# Patient Record
Sex: FEMALE | Race: WHITE | Employment: FULL TIME | URBAN - METROPOLITAN AREA
[De-identification: names, ages, dates, MRNs, and addresses within clinical notes are randomized per-mention and may not be internally consistent; named-entity substitution may affect disease eponyms.]

---

## 2017-07-26 ENCOUNTER — OFFICE VISIT (OUTPATIENT)
Dept: FAMILY MEDICINE CLINIC | Facility: CLINIC | Age: 47
End: 2017-07-26

## 2017-07-26 VITALS
RESPIRATION RATE: 20 BRPM | BODY MASS INDEX: 30.86 KG/M2 | WEIGHT: 183 LBS | OXYGEN SATURATION: 98 % | HEIGHT: 64.5 IN | TEMPERATURE: 98 F | DIASTOLIC BLOOD PRESSURE: 74 MMHG | SYSTOLIC BLOOD PRESSURE: 112 MMHG | HEART RATE: 94 BPM

## 2017-07-26 DIAGNOSIS — Z02.89 ENCOUNTER FOR PHYSICAL EXAMINATION RELATED TO EMPLOYMENT: Primary | ICD-10-CM

## 2017-07-26 PROCEDURE — 99396 PREV VISIT EST AGE 40-64: CPT | Performed by: FAMILY MEDICINE

## 2017-07-26 NOTE — PROGRESS NOTES
CHIEF COMPLAINT:   Patient presents with:  Employment Physical      HPI:   Sonia Hernandez is a 48year old female who presents for a complete physical exam. Pt is occupational therapist. Has had tb testing done already.     Patient has no new health con or other bleeding disorders  ENDOCRINE: denies thyroid history  ALLERGY/ASTHMA: denies hx of seasonal allergies or asthma    EXAM:   /74   Pulse 94   Temp 98.1 °F (36.7 °C) (Oral)   Resp 20   Ht 64.5\"   Wt 183 lb   LMP 07/05/2017   SpO2 98%   BMI 30

## 2017-08-04 ENCOUNTER — OFFICE VISIT (OUTPATIENT)
Dept: FAMILY MEDICINE CLINIC | Facility: CLINIC | Age: 47
End: 2017-08-04

## 2017-08-04 VITALS
SYSTOLIC BLOOD PRESSURE: 116 MMHG | BODY MASS INDEX: 30.86 KG/M2 | RESPIRATION RATE: 16 BRPM | HEART RATE: 81 BPM | TEMPERATURE: 98 F | DIASTOLIC BLOOD PRESSURE: 80 MMHG | WEIGHT: 183 LBS | OXYGEN SATURATION: 98 % | HEIGHT: 64.5 IN

## 2017-08-04 DIAGNOSIS — J02.9 SORE THROAT: Primary | ICD-10-CM

## 2017-08-04 LAB — CONTROL LINE PRESENT WITH A CLEAR BACKGROUND (YES/NO): YES YES/NO

## 2017-08-04 PROCEDURE — 99213 OFFICE O/P EST LOW 20 MIN: CPT | Performed by: FAMILY MEDICINE

## 2017-08-04 PROCEDURE — 87081 CULTURE SCREEN ONLY: CPT | Performed by: FAMILY MEDICINE

## 2017-08-04 PROCEDURE — 87880 STREP A ASSAY W/OPTIC: CPT | Performed by: FAMILY MEDICINE

## 2017-08-04 NOTE — PATIENT INSTRUCTIONS
Most viral illnesses get worse for the first 3-5 days, then plateau and improve gradually over the next 3-5 days. Monitor symptoms for now. Use otc meds for comfort as needed-- ibuprofen for pain.    If no better in 2-3 days, or if symptoms steadily wor

## 2017-08-04 NOTE — PROGRESS NOTES
CHIEF COMPLAINT:   Patient presents with:  Sore Throat: x 2 days        HPI:   Leann Howard is a 48year old female presents to clinic with complaint of sore throat. Patient has had for 1 day.  Patient denies congestion, cough,fever, headache, nause rashes,no suspicious lesions  HEAD: atraumatic, normocephalic  EYES: conjunctivae clear, EOM intact  EARS: TM's clear, non-injected, no bulging, retraction, or fluid bilaterally  NOSE: nostrils patent, no exudates, nasal mucosa pink and noninflamed  THROAT plan.  The patient is asked to follow up with their PCP as needed.

## 2018-08-08 ENCOUNTER — OFFICE VISIT (OUTPATIENT)
Dept: FAMILY MEDICINE CLINIC | Facility: CLINIC | Age: 48
End: 2018-08-08

## 2018-08-08 VITALS
SYSTOLIC BLOOD PRESSURE: 122 MMHG | HEART RATE: 84 BPM | TEMPERATURE: 99 F | HEIGHT: 66 IN | RESPIRATION RATE: 18 BRPM | DIASTOLIC BLOOD PRESSURE: 76 MMHG | BODY MASS INDEX: 28.93 KG/M2 | WEIGHT: 180 LBS | OXYGEN SATURATION: 99 %

## 2018-08-08 DIAGNOSIS — Z00.00 PHYSICAL EXAM: Primary | ICD-10-CM

## 2018-08-08 PROCEDURE — 99396 PREV VISIT EST AGE 40-64: CPT | Performed by: PHYSICIAN ASSISTANT

## 2018-08-08 NOTE — PROGRESS NOTES
CHIEF COMPLAINT:         HPI:   Lizzette Rodriguez is a 50year old female who presents for a complete physical exam for work. She is an occupational therapist. TB with work annually. Last one around 11/2017-negative.  She takes Singulair for seasonal aller bleeding disorders  ENDOCRINE: denies thyroid history  ALLERGY/ASTHMA: See HPI     EXAM:   /76   Pulse 84   Temp 98.7 °F (37.1 °C) (Oral)   Resp 18   Ht 66\"   Wt 180 lb   LMP 07/25/2018   SpO2 99%   BMI 29.05 kg/m²   Body mass index is 29.05 kg/m².

## 2019-02-20 ENCOUNTER — OFFICE VISIT (OUTPATIENT)
Dept: FAMILY MEDICINE CLINIC | Facility: CLINIC | Age: 49
End: 2019-02-20
Payer: COMMERCIAL

## 2019-02-20 VITALS
SYSTOLIC BLOOD PRESSURE: 120 MMHG | WEIGHT: 180 LBS | TEMPERATURE: 98 F | BODY MASS INDEX: 28.93 KG/M2 | HEIGHT: 66 IN | HEART RATE: 82 BPM | OXYGEN SATURATION: 98 % | RESPIRATION RATE: 18 BRPM | DIASTOLIC BLOOD PRESSURE: 82 MMHG

## 2019-02-20 DIAGNOSIS — J02.9 SORE THROAT: Primary | ICD-10-CM

## 2019-02-20 LAB
CONTROL LINE PRESENT WITH A CLEAR BACKGROUND (YES/NO): YES YES/NO
STREP GRP A CUL-SCR: NEGATIVE

## 2019-02-20 PROCEDURE — 99213 OFFICE O/P EST LOW 20 MIN: CPT | Performed by: NURSE PRACTITIONER

## 2019-02-20 PROCEDURE — 87880 STREP A ASSAY W/OPTIC: CPT | Performed by: NURSE PRACTITIONER

## 2019-02-20 PROCEDURE — 87081 CULTURE SCREEN ONLY: CPT | Performed by: NURSE PRACTITIONER

## 2019-02-20 NOTE — PROGRESS NOTES
CHIEF COMPLAINT:   Patient presents with:  Sore Throat: pt requesting rapid strep test - Started yesterday        HPI:   Elton Armenta is a 46year old female presents to clinic with complaint of sore throat. Patient has had for 1 days.  Symptoms hav EARS: TM's clear, non-injected, no bulging, retraction, or fluid bilaterally  NOSE: nostrils patent, clear nasal discharge, nasal mucosa pink  THROAT: oral mucosa pink, moist. Posterior pharynx erythematous and injected. no exudates. Tonsils 2/4.   Breath n A test has been done to find out if you or your child have strep throat. Call this facility or your healthcare provider if you were not given your test results. If the test is positive for strep infection, you will need to take antibiotic medicines.  A pres Other medicine for a child: You can give your child acetaminophen for fever, fussiness, or discomfort. In babies over 7 months of age, you may use ibuprofen instead of acetaminophen.  If your child has chronic liver or kidney disease or ever had a stomach u · Don’t have close contact with people who have sore throats, colds, or other upper respiratory infections. · Don’t smoke, and stay away from secondhand smoke. · Stay up to date with of your vaccines.   Date Last Reviewed: 11/1/2017  © [id number] [employer]

## 2020-02-07 ENCOUNTER — HOSPITAL ENCOUNTER (OUTPATIENT)
Age: 50
Discharge: HOME | End: 2020-02-07
Attending: FAMILY MEDICINE
Payer: COMMERCIAL

## 2020-02-07 VITALS
OXYGEN SATURATION: 95 % | WEIGHT: 180 LBS | TEMPERATURE: 101 F | RESPIRATION RATE: 18 BRPM | BODY MASS INDEX: 28.93 KG/M2 | HEART RATE: 115 BPM | SYSTOLIC BLOOD PRESSURE: 116 MMHG | HEIGHT: 66 IN | DIASTOLIC BLOOD PRESSURE: 76 MMHG

## 2020-02-07 DIAGNOSIS — J10.1 INFLUENZA A: Primary | ICD-10-CM

## 2020-02-07 LAB
POCT INFLUENZA A: POSITIVE
POCT INFLUENZA B: NEGATIVE

## 2020-02-07 PROCEDURE — 87502 INFLUENZA DNA AMP PROBE: CPT | Performed by: FAMILY MEDICINE

## 2020-02-07 PROCEDURE — 99213 OFFICE O/P EST LOW 20 MIN: CPT

## 2020-02-07 PROCEDURE — 99204 OFFICE O/P NEW MOD 45 MIN: CPT

## 2020-02-07 RX ORDER — OSELTAMIVIR PHOSPHATE 75 MG/1
75 CAPSULE ORAL 2 TIMES DAILY
Qty: 10 CAPSULE | Refills: 0 | Status: SHIPPED | OUTPATIENT
Start: 2020-02-07 | End: 2020-02-12

## 2020-02-07 RX ORDER — BENZONATATE 100 MG/1
100 CAPSULE ORAL 3 TIMES DAILY PRN
Qty: 30 CAPSULE | Refills: 0 | Status: SHIPPED | OUTPATIENT
Start: 2020-02-07 | End: 2020-03-08

## 2020-02-08 NOTE — ED PROVIDER NOTES
Patient [facility]en in: [facility]      History   Patient presents with:  Cough/URI  Fever  Body ache and/or chills    Stated Complaint: COUGH/BODY ACHES/FEVER/CONGESTION SINCE YESTERDAY    HPI    57-year-old female history of hyperten ear exam. Bilateral TMs are clear. Nose: Bilateral nares are clear. Mouth: MMM. Posterior pharynx is clear. No erythema. No exudate. Uvula is midline. Neck: Supple, NT, FROM. No meningeal signs. LAD: No lymphadenopathy.   Heart: S1,S2 RRR, No murmur

## 2021-02-12 PROCEDURE — 88305 TISSUE EXAM BY PATHOLOGIST: CPT | Performed by: INTERNAL MEDICINE

## 2022-10-28 NOTE — ED INITIAL ASSESSMENT (HPI)
"Subjective:       Patient ID: Atif Morales is a 63 y.o. male.    Chief Complaint: Recurrent Skin Infections (Left lower leg )    HPI:  Pleasant 63-year-old gentleman who presents today with inflamed skin lesion on the left lateral lower leg.  Patient has some skin changes associated with sun damage as well.  At today he presents with what he thought was perhaps an infection.  Examination reveals inflamed seborrheic keratosis and cryotherapy was applied today gently.  Review of Systems   Constitutional: Negative.    HENT: Negative.     Eyes: Negative.    Respiratory: Negative.     Cardiovascular: Negative.    Gastrointestinal: Negative.    Endocrine: Negative.    Genitourinary: Negative.    Musculoskeletal: Negative.    Skin: Negative.         LLE skin lesion     Allergic/Immunologic: Negative.    Neurological: Negative.    Hematological: Negative.    Psychiatric/Behavioral: Negative.       Objective:      Vitals:    10/28/22 1314   BP: 116/82   Pulse: (!) 55   SpO2: 97%   Weight: (!) 147.1 kg (324 lb 4.8 oz)   Height: 6' 4" (1.93 m)      Physical Exam  Vitals and nursing note reviewed.   Constitutional:       Appearance: Normal appearance. He is obese.   HENT:      Head: Normocephalic and atraumatic.      Nose: Nose normal.      Mouth/Throat:      Mouth: Mucous membranes are moist.      Pharynx: Oropharynx is clear.   Eyes:      Extraocular Movements: Extraocular movements intact.      Conjunctiva/sclera: Conjunctivae normal.      Pupils: Pupils are equal, round, and reactive to light.   Cardiovascular:      Rate and Rhythm: Normal rate and regular rhythm.   Pulmonary:      Effort: Pulmonary effort is normal.      Breath sounds: Normal breath sounds.   Abdominal:      General: Abdomen is flat. Bowel sounds are normal.      Palpations: Abdomen is soft.   Musculoskeletal:         General: Normal range of motion.      Cervical back: Normal range of motion and neck supple.   Skin:     General: Skin is warm and dry. " Pt presents tonight with c/o fevers, non-productive cough, and body aches since yesterday. Tmax 103.      Capillary Refill: Capillary refill takes less than 2 seconds.      Findings: Lesion present.      Comments: Cryotherapy applied to has seborrheic keratosis approximately 1 x 1 cm.  No regular borders.  Keratosis evident mildly hyper pigmented.  All the characteristics of a SK.   Neurological:      General: No focal deficit present.      Mental Status: He is alert and oriented to person, place, and time.   Psychiatric:         Mood and Affect: Mood normal.         Behavior: Behavior normal.         Thought Content: Thought content normal.         Judgment: Judgment normal.       Results for orders placed or performed in visit on 09/02/22   TSH   Result Value Ref Range    TSH 2.828 0.400 - 4.000 uIU/mL      Assessment:       1. Seborrheic keratoses        Plan:       Seborrheic keratoses  Comments:  treated.         Medication List with Changes/Refills   Current Medications    ACIDOPHILUS (LACTINEX) 100 MILLION CELL PACKET    Take 1 tablet by mouth 2 (two) times daily.    BUSPIRONE (BUSPAR) 30 MG TAB    Take 1 tablet (30 mg total) by mouth 2 (two) times daily.    DIETARY SUPPLEMENT ORAL    Take by mouth.    ESCITALOPRAM OXALATE (LEXAPRO) 20 MG TABLET    Take 1 tablet (20 mg total) by mouth once daily.    FLUTICASONE PROPIONATE (FLONASE) 50 MCG/ACTUATION NASAL SPRAY    SHAKE LIQUID AND USE 2 SPRAYS(100 MCG) IN EACH NOSTRIL EVERY DAY    HYDROCHLOROTHIAZIDE (HYDRODIURIL) 12.5 MG TAB    Take 1 tablet (12.5 mg total) by mouth once daily.    LISINOPRIL (PRINIVIL,ZESTRIL) 2.5 MG TABLET    Take 1 tablet (2.5 mg total) by mouth once daily.    POTASSIUM ORAL    Take 90 mg by mouth once daily.    ROSUVASTATIN (CRESTOR) 10 MG TABLET    Take 1 tablet (10 mg total) by mouth once daily.    TADALAFIL (CIALIS) 20 MG TAB    Take 1 tablet (20 mg total) by mouth once daily.    TAMSULOSIN (FLOMAX) 0.4 MG CAP    Take 1 capsule (0.4 mg total) by mouth once daily.   Discontinued Medications    ALBUTEROL (VENTOLIN HFA) 90 MCG/ACTUATION  INHALER    Inhale 2 puffs into the lungs every 6 (six) hours as needed for Wheezing. Rescue

## 2023-04-19 ENCOUNTER — OFFICE VISIT (OUTPATIENT)
Dept: FAMILY MEDICINE CLINIC | Facility: CLINIC | Age: 53
End: 2023-04-19
Payer: COMMERCIAL

## 2023-04-19 VITALS
TEMPERATURE: 98 F | SYSTOLIC BLOOD PRESSURE: 126 MMHG | HEART RATE: 76 BPM | RESPIRATION RATE: 18 BRPM | WEIGHT: 180 LBS | HEIGHT: 66 IN | DIASTOLIC BLOOD PRESSURE: 86 MMHG | BODY MASS INDEX: 28.93 KG/M2 | OXYGEN SATURATION: 98 %

## 2023-04-19 DIAGNOSIS — J01.10 ACUTE NON-RECURRENT FRONTAL SINUSITIS: Primary | ICD-10-CM

## 2023-04-19 DIAGNOSIS — R05.1 ACUTE COUGH: ICD-10-CM

## 2023-04-19 DIAGNOSIS — R53.83 FATIGUE, UNSPECIFIED TYPE: ICD-10-CM

## 2023-04-19 PROCEDURE — 3074F SYST BP LT 130 MM HG: CPT

## 2023-04-19 PROCEDURE — 3079F DIAST BP 80-89 MM HG: CPT

## 2023-04-19 PROCEDURE — 99202 OFFICE O/P NEW SF 15 MIN: CPT

## 2023-04-19 PROCEDURE — 3008F BODY MASS INDEX DOCD: CPT

## 2023-04-19 RX ORDER — AMOXICILLIN AND CLAVULANATE POTASSIUM 875; 125 MG/1; MG/1
1 TABLET, FILM COATED ORAL 2 TIMES DAILY
Qty: 20 TABLET | Refills: 0 | Status: SHIPPED | OUTPATIENT
Start: 2023-04-19 | End: 2023-04-29

## 2023-09-18 ENCOUNTER — OFFICE VISIT (OUTPATIENT)
Dept: FAMILY MEDICINE CLINIC | Facility: CLINIC | Age: 53
End: 2023-09-18

## 2023-09-18 VITALS
SYSTOLIC BLOOD PRESSURE: 118 MMHG | HEIGHT: 66 IN | RESPIRATION RATE: 16 BRPM | HEART RATE: 78 BPM | TEMPERATURE: 98 F | OXYGEN SATURATION: 97 % | WEIGHT: 190 LBS | BODY MASS INDEX: 30.53 KG/M2 | DIASTOLIC BLOOD PRESSURE: 82 MMHG

## 2023-09-18 DIAGNOSIS — Z02.1 PHYSICAL EXAM, PRE-EMPLOYMENT: Primary | ICD-10-CM
